# Patient Record
Sex: MALE | NOT HISPANIC OR LATINO | URBAN - METROPOLITAN AREA
[De-identification: names, ages, dates, MRNs, and addresses within clinical notes are randomized per-mention and may not be internally consistent; named-entity substitution may affect disease eponyms.]

---

## 2019-04-01 ENCOUNTER — TELEPHONE (OUTPATIENT)
Dept: DERMATOLOGY | Age: 59
End: 2019-04-01

## 2019-11-06 ENCOUNTER — OFFICE VISIT (OUTPATIENT)
Dept: FAMILY MEDICINE CLINIC | Facility: CLINIC | Age: 59
End: 2019-11-06
Payer: COMMERCIAL

## 2019-11-06 VITALS
WEIGHT: 140 LBS | OXYGEN SATURATION: 99 % | HEIGHT: 63 IN | SYSTOLIC BLOOD PRESSURE: 126 MMHG | HEART RATE: 62 BPM | TEMPERATURE: 97 F | BODY MASS INDEX: 24.8 KG/M2 | DIASTOLIC BLOOD PRESSURE: 84 MMHG

## 2019-11-06 DIAGNOSIS — Z00.00 PREVENTATIVE HEALTH CARE: Primary | ICD-10-CM

## 2019-11-06 DIAGNOSIS — Z00.00 LABORATORY EXAM ORDERED AS PART OF ROUTINE GENERAL MEDICAL EXAMINATION: ICD-10-CM

## 2019-11-06 DIAGNOSIS — K52.9 GASTROENTERITIS: ICD-10-CM

## 2019-11-06 DIAGNOSIS — Z80.0 FAMILY HISTORY OF COLON CANCER: ICD-10-CM

## 2019-11-06 DIAGNOSIS — Z12.5 SCREENING FOR PROSTATE CANCER: ICD-10-CM

## 2019-11-06 DIAGNOSIS — L71.9 ROSACEA: ICD-10-CM

## 2019-11-06 DIAGNOSIS — Z13.220 SCREENING, LIPID: ICD-10-CM

## 2019-11-06 PROCEDURE — 99386 PREV VISIT NEW AGE 40-64: CPT | Performed by: FAMILY MEDICINE

## 2019-11-06 NOTE — H&P
David Almonte is a 63year old male who presents for a complete physical exam.   HPI:   Pt voices concerns of stomach problems x 10 days, lower abd cramping and diarrhea- multiple episodes, no emesis,  Getting better.     Wt Readings from Last 6 Encounte : +. Children: 2 son. Exercise: active w/ job.   Diet: watches minimally     REVIEW OF SYSTEMS:   GENERAL: generally feels well, no fatigue, denies excessive daytime drowsiness, good appetite  SKIN: denies any unusual skin lesions or rashes  EYES:d descended atrophic testes,no masses,no hernia,no penile lesions  RECTAL: good rectal tone, prostate shows no masses, normal size, stool is OB negative, non-thrombosed external hemorrhoids  BACK:  : FROM, No lateral curves, Flexion:  Fingers to floor   EXTR

## 2019-11-06 NOTE — PATIENT INSTRUCTIONS
I reviewed age-appropriate preventive health screening exams as well as immunizations. Flu vaccine declined.   Would recommend shingles vaccine at age 62  Discussed signs and symptoms of skin cancer as well as importance of frequent application of sunscree

## 2019-11-15 ENCOUNTER — TELEPHONE (OUTPATIENT)
Dept: FAMILY MEDICINE CLINIC | Facility: CLINIC | Age: 59
End: 2019-11-15

## 2019-11-15 NOTE — TELEPHONE ENCOUNTER
At this point, taking antiviral medication for a cold sore that is going to last 10 days is not going to change the course of the cold sore.   If this is a recurring problem for him then starting valacyclovir 2 g twice daily for 1 day at the earliest onset

## 2019-11-15 NOTE — TELEPHONE ENCOUNTER
He was here last week for a physical with Dr Bhargavi Mathis and he had a cold sore then and it has not gone away so he is asking for some medication for that.      Huy murphy Laya Root

## 2019-12-14 ENCOUNTER — NURSE ONLY (OUTPATIENT)
Dept: FAMILY MEDICINE CLINIC | Facility: CLINIC | Age: 59
End: 2019-12-14
Payer: COMMERCIAL

## 2019-12-14 DIAGNOSIS — Z13.220 SCREENING, LIPID: ICD-10-CM

## 2019-12-14 DIAGNOSIS — Z12.5 SCREENING FOR PROSTATE CANCER: ICD-10-CM

## 2019-12-14 DIAGNOSIS — Z00.00 LABORATORY EXAM ORDERED AS PART OF ROUTINE GENERAL MEDICAL EXAMINATION: ICD-10-CM

## 2019-12-14 LAB
ALBUMIN SERPL-MCNC: 4 G/DL (ref 3.4–5)
ALBUMIN/GLOB SERPL: 1.1 {RATIO} (ref 1–2)
ALP LIVER SERPL-CCNC: 62 U/L (ref 45–117)
ALT SERPL-CCNC: 30 U/L (ref 16–61)
ANION GAP SERPL CALC-SCNC: 4 MMOL/L (ref 0–18)
AST SERPL-CCNC: 21 U/L (ref 15–37)
BILIRUB SERPL-MCNC: 0.5 MG/DL (ref 0.1–2)
BUN BLD-MCNC: 16 MG/DL (ref 7–18)
BUN/CREAT SERPL: 17.2 (ref 10–20)
CALCIUM BLD-MCNC: 8.5 MG/DL (ref 8.5–10.1)
CHLORIDE SERPL-SCNC: 107 MMOL/L (ref 98–112)
CHOLEST SMN-MCNC: 197 MG/DL (ref ?–200)
CO2 SERPL-SCNC: 28 MMOL/L (ref 21–32)
COMPLEXED PSA SERPL-MCNC: 0.21 NG/ML (ref ?–4)
CREAT BLD-MCNC: 0.93 MG/DL (ref 0.7–1.3)
GLOBULIN PLAS-MCNC: 3.7 G/DL (ref 2.8–4.4)
GLUCOSE BLD-MCNC: 82 MG/DL (ref 70–99)
HDLC SERPL-MCNC: 72 MG/DL (ref 40–59)
LDLC SERPL CALC-MCNC: 112 MG/DL (ref ?–100)
M PROTEIN MFR SERPL ELPH: 7.7 G/DL (ref 6.4–8.2)
NONHDLC SERPL-MCNC: 125 MG/DL (ref ?–130)
OSMOLALITY SERPL CALC.SUM OF ELEC: 288 MOSM/KG (ref 275–295)
PATIENT FASTING Y/N/NP: YES
PATIENT FASTING Y/N/NP: YES
POTASSIUM SERPL-SCNC: 4.6 MMOL/L (ref 3.5–5.1)
SODIUM SERPL-SCNC: 139 MMOL/L (ref 136–145)
TRIGL SERPL-MCNC: 65 MG/DL (ref 30–149)
VLDLC SERPL CALC-MCNC: 13 MG/DL (ref 0–30)

## 2019-12-14 PROCEDURE — 80053 COMPREHEN METABOLIC PANEL: CPT | Performed by: FAMILY MEDICINE

## 2019-12-14 PROCEDURE — 80061 LIPID PANEL: CPT | Performed by: FAMILY MEDICINE

## 2019-12-14 PROCEDURE — 36415 COLL VENOUS BLD VENIPUNCTURE: CPT | Performed by: FAMILY MEDICINE

## 2019-12-14 PROCEDURE — 84153 ASSAY OF PSA TOTAL: CPT | Performed by: FAMILY MEDICINE

## 2019-12-16 DIAGNOSIS — Z12.5 SCREENING FOR PROSTATE CANCER: Primary | ICD-10-CM

## 2020-06-02 ENCOUNTER — TELEPHONE (OUTPATIENT)
Dept: FAMILY MEDICINE CLINIC | Facility: CLINIC | Age: 60
End: 2020-06-02

## 2020-06-02 RX ORDER — ACYCLOVIR 400 MG/1
400 TABLET ORAL 3 TIMES DAILY
Qty: 15 TABLET | Status: SHIPPED | OUTPATIENT
Start: 2020-06-02 | End: 2020-06-02

## 2020-06-02 RX ORDER — ACYCLOVIR 400 MG/1
400 TABLET ORAL 3 TIMES DAILY
Qty: 15 TABLET | Status: SHIPPED | OUTPATIENT
Start: 2020-06-02 | End: 2020-06-07

## 2020-06-02 NOTE — TELEPHONE ENCOUNTER
Pt had physical with Dr Rafat Montanez 11/15/19    Last saw Dr Sophy Frost in 2016  Last Acyclovir 2014    Pt refused appt insisting Dr Sophy Frost will fill meds

## 2020-06-02 NOTE — TELEPHONE ENCOUNTER
Cold sore meds. Offered pt. Appt. Because its been over 6 months and he said Dr. Rodriguez Neither said in his last visit he would call this med in the next time he had a cold sore. I advised our protocal has changed pt. Refused appt.

## 2020-12-08 ENCOUNTER — OFFICE VISIT (OUTPATIENT)
Dept: FAMILY MEDICINE CLINIC | Facility: CLINIC | Age: 60
End: 2020-12-08
Payer: COMMERCIAL

## 2020-12-08 VITALS
BODY MASS INDEX: 25.34 KG/M2 | HEIGHT: 63 IN | HEART RATE: 68 BPM | DIASTOLIC BLOOD PRESSURE: 78 MMHG | WEIGHT: 143 LBS | SYSTOLIC BLOOD PRESSURE: 110 MMHG | TEMPERATURE: 98 F

## 2020-12-08 DIAGNOSIS — Z00.00 PREVENTATIVE HEALTH CARE: Primary | ICD-10-CM

## 2020-12-08 DIAGNOSIS — L71.9 ROSACEA: ICD-10-CM

## 2020-12-08 DIAGNOSIS — M75.81 RIGHT ROTATOR CUFF TENDINITIS: ICD-10-CM

## 2020-12-08 PROCEDURE — 3074F SYST BP LT 130 MM HG: CPT | Performed by: FAMILY MEDICINE

## 2020-12-08 PROCEDURE — 99396 PREV VISIT EST AGE 40-64: CPT | Performed by: FAMILY MEDICINE

## 2020-12-08 PROCEDURE — 3078F DIAST BP <80 MM HG: CPT | Performed by: FAMILY MEDICINE

## 2020-12-08 PROCEDURE — 3008F BODY MASS INDEX DOCD: CPT | Performed by: FAMILY MEDICINE

## 2020-12-08 NOTE — PROGRESS NOTES
Rodolfo Mendoza is a 62year old male. Patient presents with:  Physical  Shoulder Pain: R shoulder      HPI:   Patient presents for routine physical.  He was recently cutting some trees down. He fell off a ladder and rolled onto his right shoulder.   Denise Newby denies headaches    EXAM:   /78 (BP Location: Right arm, Patient Position: Sitting, Cuff Size: adult)   Pulse 68   Temp 97.8 °F (36.6 °C) (Temporal)   Ht 5' 3\" (1.6 m)   Wt 143 lb (64.9 kg)   BMI 25.33 kg/m²   Wt Readings from Last 6 Encounters:  12 2.5 % External Cream 28 g 3     Sig: Apply 1 Application topically 2 (two) times daily as needed.        Imaging & Consults:  None

## 2020-12-22 ENCOUNTER — TELEPHONE (OUTPATIENT)
Dept: FAMILY MEDICINE CLINIC | Facility: CLINIC | Age: 60
End: 2020-12-22

## 2020-12-22 DIAGNOSIS — M75.81 RIGHT ROTATOR CUFF TENDINITIS: Primary | ICD-10-CM

## 2020-12-22 NOTE — TELEPHONE ENCOUNTER
Pt advised. He would like to send referral to [facility] for review.     [facility]  Ph. [phone]  Fax [phone]

## 2020-12-22 NOTE — TELEPHONE ENCOUNTER
Sent mychart msg. Also left detailed voicemail providing PT contact info. Advised to call office with any questions.

## 2020-12-22 NOTE — TELEPHONE ENCOUNTER
Mika Arcos would like to know if you can order the MRI prior to PT. He does not want to waste time and worried that shoulder is worse than he thinks. He states his insurance should cover  the MRI at \"100%\".     He states he is still experiencing pain, abou

## 2020-12-22 NOTE — TELEPHONE ENCOUNTER
SEEN ON 8TH, EXERCISED HE WAS GIVEN TO DO FOR SHOULDER DO NOT SEEM TO BE HELPING. WILL BE AVAILABLE AFTER 4PM TODAY OR TOMORROW.

## 2020-12-23 NOTE — TELEPHONE ENCOUNTER
Left msg for [facility] to call back.  Need to know if they will be taking care of referral approval

## 2020-12-23 NOTE — TELEPHONE ENCOUNTER
Cardiac risk assessment letter completed and sent to [facility] to fax and also contact patient to advise that letter has been faxed to Dentist   MRI order faxed to [facility], [phone]    Referral auth will be obtained by [facility], they will contact the patient and advise him where to go for test.

## 2021-01-04 ENCOUNTER — TELEPHONE (OUTPATIENT)
Dept: FAMILY MEDICINE CLINIC | Facility: CLINIC | Age: 61
End: 2021-01-04

## 2021-01-04 DIAGNOSIS — R93.6 ABNORMAL MRI, SHOULDER: ICD-10-CM

## 2021-01-04 DIAGNOSIS — M75.81 RIGHT ROTATOR CUFF TENDINITIS: Primary | ICD-10-CM

## 2021-01-04 NOTE — TELEPHONE ENCOUNTER
Per Dr. María Hein,     MRI completed through [facility] imaging center shows labral and partial rotator cuff tear. Recommend eval by Dr. Douglas Oconnell - should request copy of disc. Pt advised, verbalized understanding.      Contact info sent via mychart msg per

## 2021-01-08 ENCOUNTER — MED REC SCAN ONLY (OUTPATIENT)
Dept: FAMILY MEDICINE CLINIC | Facility: CLINIC | Age: 61
End: 2021-01-08

## 2021-08-09 ENCOUNTER — TELEPHONE (OUTPATIENT)
Dept: FAMILY MEDICINE CLINIC | Facility: CLINIC | Age: 61
End: 2021-08-09

## 2021-12-14 ENCOUNTER — OFFICE VISIT (OUTPATIENT)
Dept: FAMILY MEDICINE CLINIC | Facility: CLINIC | Age: 61
End: 2021-12-14
Payer: COMMERCIAL

## 2021-12-14 VITALS
HEART RATE: 82 BPM | SYSTOLIC BLOOD PRESSURE: 118 MMHG | HEIGHT: 66 IN | DIASTOLIC BLOOD PRESSURE: 72 MMHG | RESPIRATION RATE: 18 BRPM | WEIGHT: 141 LBS | TEMPERATURE: 99 F | BODY MASS INDEX: 22.66 KG/M2

## 2021-12-14 DIAGNOSIS — Z80.0 FAMILY HISTORY OF COLON CANCER: ICD-10-CM

## 2021-12-14 DIAGNOSIS — L71.9 ROSACEA: ICD-10-CM

## 2021-12-14 DIAGNOSIS — L30.9 ECZEMA, UNSPECIFIED TYPE: ICD-10-CM

## 2021-12-14 DIAGNOSIS — Z00.00 PREVENTATIVE HEALTH CARE: Primary | ICD-10-CM

## 2021-12-14 DIAGNOSIS — Z12.5 PROSTATE CANCER SCREENING: ICD-10-CM

## 2021-12-14 PROCEDURE — 84153 ASSAY OF PSA TOTAL: CPT | Performed by: FAMILY MEDICINE

## 2021-12-14 PROCEDURE — 3078F DIAST BP <80 MM HG: CPT | Performed by: FAMILY MEDICINE

## 2021-12-14 PROCEDURE — 3074F SYST BP LT 130 MM HG: CPT | Performed by: FAMILY MEDICINE

## 2021-12-14 PROCEDURE — 3008F BODY MASS INDEX DOCD: CPT | Performed by: FAMILY MEDICINE

## 2021-12-14 PROCEDURE — 99396 PREV VISIT EST AGE 40-64: CPT | Performed by: FAMILY MEDICINE

## 2021-12-14 NOTE — PROGRESS NOTES
Jeannett Najjar is a 62year old male. Patient presents with:  Physical      HPI:   No complaints  hydrocortisone 2.5 % External Cream, Apply 1 Application topically 2 (two) times daily as needed. , Disp: 28 g, Rfl: 3  metRONIDAZOLE 0.75 % External Cream, kg)  04/22/16 : 135 lb (61.2 kg)  03/01/16 : 152 lb 4 oz (69.1 kg)  07/23/14 : 148 lb 8 oz (67.4 kg)    Patient weight not recorded    GENERAL: well developed, well nourished,in no apparent distress  SKIN: no rashes,no suspicious lesions  HEENT: atraumatic mg/dL       • HDL Cholesterol 12/14/2019 72* 40 - 59 mg/dL Final    Interpretive Information:   An HDL cholesterol <40 mg/dL is low and constitutes a coronary heart disease risk factor.  An HDL cholesterol >60 mg/dL is a negative risk factor for coronary he physicians ask all patients who may be on biotin supplementation to stop biotin consumption at least 72 hours prior to collection of a new sample.          ASSESSMENT AND PLAN:     Preventative health care  (primary encounter diagnosis)  Rosacea  Family h

## 2021-12-15 DIAGNOSIS — Z12.5 PROSTATE CANCER SCREENING: ICD-10-CM

## 2021-12-15 DIAGNOSIS — Z00.00 PREVENTATIVE HEALTH CARE: Primary | ICD-10-CM

## 2021-12-27 NOTE — TELEPHONE ENCOUNTER
Requested Prescriptions     Pending Prescriptions Disp Refills   • hydrocortisone 2.5 % External Cream 28 g 3     Sig: Apply 1 Application topically 2 (two) times daily as needed.      Last refil 28g x 3 refills on 12/8/2020  Last office visit pertaining to

## 2021-12-30 ENCOUNTER — TELEPHONE (OUTPATIENT)
Dept: FAMILY MEDICINE CLINIC | Facility: CLINIC | Age: 61
End: 2021-12-30

## 2021-12-30 NOTE — TELEPHONE ENCOUNTER
hydrocortisone 2.5 % External Cream WAS SENT TO [facility], PT WILL PICK IT UP FROM THERE, BUT ALSO WANTS THIS SENT TO [facility]

## 2022-01-14 ENCOUNTER — OFFICE VISIT (OUTPATIENT)
Dept: SURGERY | Facility: CLINIC | Age: 62
End: 2022-01-14
Payer: COMMERCIAL

## 2022-01-14 VITALS — BODY MASS INDEX: 22.66 KG/M2 | WEIGHT: 141 LBS | HEART RATE: 69 BPM | TEMPERATURE: 97 F | HEIGHT: 66 IN

## 2022-01-14 DIAGNOSIS — Z80.0 FAMILY HISTORY OF COLON CANCER: Primary | ICD-10-CM

## 2022-01-14 PROCEDURE — 3008F BODY MASS INDEX DOCD: CPT | Performed by: SURGERY

## 2022-01-14 PROCEDURE — S0285 CNSLT BEFORE SCREEN COLONOSC: HCPCS | Performed by: SURGERY

## 2022-01-14 RX ORDER — SODIUM, POTASSIUM,MAG SULFATES 17.5-3.13G
SOLUTION, RECONSTITUTED, ORAL ORAL
Qty: 1 EACH | Refills: 0 | Status: SHIPPED | OUTPATIENT
Start: 2022-01-14

## 2022-01-14 NOTE — H&P
Cecy Schwartz is a 62year old male  Patient presents with:  Colonoscopy: Last colonoscopy done 2016 Dr. Guy Pereira. Denies any change in bowel habits.        REFERRED BY    Patient presents for colonoscopy   Was done in 2016 and no polyps found   Pt has no c changes  RESPIRATORY: denies shortness of breath, wheezing or cough   CARDIOVASCULAR: denies chest pain or BOWLES; no palpitations   GI: denies nausea, vomiting, constipation, diarrhea; no rectal bleeding; no heartburn  GENITAL/: no dysuria, urgency or freq monitoring of prostate cancer patients. Elevated PSA concentrations can only suggest the presence of prostate cancer until biopsy is performed, which is required for diagnosis of cancer. PSA concentrations can be elevated in the prostate.   PSA is general

## 2022-01-18 RX ORDER — POLYETHYLENE GLYCOL 3350, SODIUM CHLORIDE, SODIUM BICARBONATE, POTASSIUM CHLORIDE 420; 11.2; 5.72; 1.48 G/4L; G/4L; G/4L; G/4L
POWDER, FOR SOLUTION ORAL
Qty: 1 EACH | Refills: 0 | Status: SHIPPED | OUTPATIENT
Start: 2022-01-18

## 2022-01-21 ENCOUNTER — TELEPHONE (OUTPATIENT)
Dept: FAMILY MEDICINE CLINIC | Facility: CLINIC | Age: 62
End: 2022-01-21

## 2022-01-21 RX ORDER — MOMETASONE FUROATE 1 MG/G
1 CREAM TOPICAL 2 TIMES DAILY PRN
Qty: 60 G | Refills: 0 | Status: SHIPPED | OUTPATIENT
Start: 2022-01-21

## 2022-01-21 NOTE — TELEPHONE ENCOUNTER
MYKEL Lamar [address], [address] [phone], [phone]      PT WOULD LIKE SOMETHING CALLED IN FOR HIS  ECZEMA ON HIS HANDS AND FEET-SPOKE TO KK AT HIS PHYSICAL APPOINTMENT ABOUT THIS    [place]

## 2022-01-21 NOTE — TELEPHONE ENCOUNTER
Dr. Blanco Holland,  Patient requesting Rx for cracked hands; states you spoke with him at physical. Lucrecia Iliananing to wait until back in office.

## 2022-02-02 PROCEDURE — 88305 TISSUE EXAM BY PATHOLOGIST: CPT | Performed by: SURGERY

## 2022-02-07 ENCOUNTER — PATIENT OUTREACH (OUTPATIENT)
Dept: SURGERY | Facility: CLINIC | Age: 62
End: 2022-02-07

## 2022-02-17 ENCOUNTER — TELEPHONE (OUTPATIENT)
Dept: FAMILY MEDICINE CLINIC | Facility: CLINIC | Age: 62
End: 2022-02-17

## 2022-02-17 RX ORDER — MOMETASONE FUROATE 1 MG/G
1 CREAM TOPICAL 2 TIMES DAILY PRN
Qty: 60 G | Refills: 0 | Status: SHIPPED | OUTPATIENT
Start: 2022-02-17

## 2022-02-17 RX ORDER — MOMETASONE FUROATE 1 MG/G
1 CREAM TOPICAL 2 TIMES DAILY PRN
Qty: 60 G | Refills: 0 | Status: SHIPPED | OUTPATIENT
Start: 2022-02-17 | End: 2022-02-17

## 2022-02-17 NOTE — TELEPHONE ENCOUNTER
Per Dr. Trena Molina, pt should not be using cream long-term as it can cause a steroid induced dermatitis. Pt states eczema on chest and back has improved, but is now noticing it on his hands. He has been applying cream BID. Advised pt she should be using cream prn, he should try to limit application and apply thinly. Pt verbalized understanding. Per pt he will be running out of med in a few days. Requests med to be sent to local pharmacy. Advised we will be canceling mail-order prescription. Called [facility] and canceled prescription with [facility] pharmacy tech. Advised pt he needs to be seen if no improvement. Verbalized understanding.

## 2022-02-17 NOTE — TELEPHONE ENCOUNTER
Pt states [facility] has been trying to contact us regarding Mometasone Furoate 0.1 % External Cream but we are not returning their call. He is trying to get the medication filled there. He will have them fax over the questions they have.

## 2022-02-17 NOTE — TELEPHONE ENCOUNTER
Idalia Tobias is calling he would like to know if you could please call in a 90 day supply of Mometasone Furoate 0.1 % External Cream to [facility] on [address] in [place] but please leave the [facility] they have placed that on hold. He is running out of this and needs it sooner then [facility] can get it to him.

## 2022-02-17 NOTE — TELEPHONE ENCOUNTER
Prescription has been canceled per Dr. Gennaro Ruiz orders. New script sent to local pharmacy.  See TE.

## 2022-02-17 NOTE — TELEPHONE ENCOUNTER
LOV: 12/14/21    LAST LAB: n/a    LAST RX: 1/21/22    Next OV: No future appointments.     PROTOCOL: n/a

## 2022-02-23 ENCOUNTER — TELEPHONE (OUTPATIENT)
Dept: FAMILY MEDICINE CLINIC | Facility: CLINIC | Age: 62
End: 2022-02-23

## 2022-02-23 RX ORDER — MOMETASONE FUROATE 1 MG/G
1 CREAM TOPICAL 2 TIMES DAILY PRN
Qty: 60 G | Refills: 0 | Status: CANCELLED | OUTPATIENT
Start: 2022-02-23

## 2022-02-23 NOTE — TELEPHONE ENCOUNTER
Calling pt-  Mometasone cream was called into [facility] in [facility] 02/17. Vicente is okay with this, was unaware it was sent there, but [facility] is okay. He will go there and get this.

## 2022-02-23 NOTE — TELEPHONE ENCOUNTER
MOMETASONE CREAM      NEEDS 1 TUBE CALLED TO  [facility] IN [place] BECAUSE HE NEEDS THIS BEFORE  COMES IN THE MAIL FROM [facility]

## 2022-11-17 RX ORDER — MOMETASONE FUROATE 1 MG/G
1 CREAM TOPICAL 2 TIMES DAILY PRN
Qty: 60 G | Refills: 0 | Status: SHIPPED | OUTPATIENT
Start: 2022-11-17

## 2022-11-17 NOTE — TELEPHONE ENCOUNTER
Mometasone   Last refilled 2/17/22 #60g/0 refills    Hydrocortisone   Last refilled 12/30/21 #28g/3 refills    Last OV 12/14/21 with Dr. Dunia Carty  No pending visit  [facility] sent advising pt he is due for an OV

## 2023-03-25 ENCOUNTER — TELEPHONE (OUTPATIENT)
Dept: FAMILY MEDICINE CLINIC | Facility: CLINIC | Age: 63
End: 2023-03-25

## 2023-03-27 ENCOUNTER — MED REC SCAN ONLY (OUTPATIENT)
Dept: FAMILY MEDICINE CLINIC | Facility: CLINIC | Age: 63
End: 2023-03-27

## 2023-03-27 RX ORDER — MOMETASONE FUROATE 1 MG/G
CREAM TOPICAL
Qty: 45 G | Refills: 0 | Status: SHIPPED | OUTPATIENT
Start: 2023-03-27

## 2023-03-27 NOTE — TELEPHONE ENCOUNTER
See pt's REPLY to Breann's message. Pt has not been seen in our office since 12/14/21 and has been advised to schedule an appt with you to get established.

## 2023-04-01 ENCOUNTER — TELEPHONE (OUTPATIENT)
Dept: FAMILY MEDICINE CLINIC | Facility: CLINIC | Age: 63
End: 2023-04-01

## 2023-04-01 NOTE — TELEPHONE ENCOUNTER
MOMETASONE FUROATE 0.1 % External Cream was sent to pharmacy. Pharmacy advised pt that the script was enough for 23 days. He said he always get a 90 day refill. He wanted to know why it was written like that.

## 2023-04-02 NOTE — TELEPHONE ENCOUNTER
I have not seen him in over 3 yrs and chronic use use of topical steroids can cause skin damage if used inappropriately.   He needs to make a f/u visit to establish care since Dr Oscar Teixeira left

## 2023-04-03 RX ORDER — MOMETASONE FUROATE 1 MG/G
CREAM TOPICAL
Qty: 60 G | Refills: 1 | OUTPATIENT
Start: 2023-04-03

## 2023-05-09 ENCOUNTER — TELEPHONE (OUTPATIENT)
Dept: FAMILY MEDICINE CLINIC | Facility: CLINIC | Age: 63
End: 2023-05-09

## 2023-08-29 ENCOUNTER — OFFICE VISIT (OUTPATIENT)
Dept: FAMILY MEDICINE CLINIC | Facility: CLINIC | Age: 63
End: 2023-08-29
Payer: COMMERCIAL

## 2023-08-29 ENCOUNTER — LABORATORY ENCOUNTER (OUTPATIENT)
Dept: LAB | Age: 63
End: 2023-08-29
Attending: FAMILY MEDICINE
Payer: COMMERCIAL

## 2023-08-29 VITALS
TEMPERATURE: 97 F | HEART RATE: 73 BPM | DIASTOLIC BLOOD PRESSURE: 70 MMHG | SYSTOLIC BLOOD PRESSURE: 120 MMHG | RESPIRATION RATE: 16 BRPM | OXYGEN SATURATION: 98 % | BODY MASS INDEX: 23.49 KG/M2 | WEIGHT: 141 LBS | HEIGHT: 65 IN

## 2023-08-29 DIAGNOSIS — H93.13 TINNITUS OF BOTH EARS: ICD-10-CM

## 2023-08-29 DIAGNOSIS — Z00.00 PREVENTATIVE HEALTH CARE: ICD-10-CM

## 2023-08-29 DIAGNOSIS — R74.8 ELEVATED LIVER ENZYMES: Primary | ICD-10-CM

## 2023-08-29 DIAGNOSIS — Z13.220 SCREENING, LIPID: ICD-10-CM

## 2023-08-29 DIAGNOSIS — Z00.00 PREVENTATIVE HEALTH CARE: Primary | ICD-10-CM

## 2023-08-29 DIAGNOSIS — Z12.5 PROSTATE CANCER SCREENING: ICD-10-CM

## 2023-08-29 DIAGNOSIS — D12.6 TUBULAR ADENOMA OF COLON: ICD-10-CM

## 2023-08-29 DIAGNOSIS — Z80.0 FAMILY HISTORY OF COLON CANCER: ICD-10-CM

## 2023-08-29 DIAGNOSIS — H90.3 SENSORINEURAL HEARING LOSS (SNHL) OF BOTH EARS: ICD-10-CM

## 2023-08-29 LAB
ALBUMIN SERPL-MCNC: 3.9 G/DL (ref 3.4–5)
ALBUMIN/GLOB SERPL: 1.2 {RATIO} (ref 1–2)
ALP LIVER SERPL-CCNC: 66 U/L
ALT SERPL-CCNC: 97 U/L
ANION GAP SERPL CALC-SCNC: 6 MMOL/L (ref 0–18)
AST SERPL-CCNC: 263 U/L (ref 15–37)
BILIRUB SERPL-MCNC: 0.6 MG/DL (ref 0.1–2)
BUN BLD-MCNC: 12 MG/DL (ref 7–18)
CALCIUM BLD-MCNC: 8.8 MG/DL (ref 8.5–10.1)
CHLORIDE SERPL-SCNC: 97 MMOL/L (ref 98–112)
CO2 SERPL-SCNC: 28 MMOL/L (ref 21–32)
CREAT BLD-MCNC: 0.74 MG/DL
EGFRCR SERPLBLD CKD-EPI 2021: 102 ML/MIN/1.73M2 (ref 60–?)
FASTING STATUS PATIENT QL REPORTED: NO
GLOBULIN PLAS-MCNC: 3.3 G/DL (ref 2.8–4.4)
GLUCOSE BLD-MCNC: 91 MG/DL (ref 70–99)
OSMOLALITY SERPL CALC.SUM OF ELEC: 271 MOSM/KG (ref 275–295)
POTASSIUM SERPL-SCNC: 4.5 MMOL/L (ref 3.5–5.1)
PROT SERPL-MCNC: 7.2 G/DL (ref 6.4–8.2)
SODIUM SERPL-SCNC: 131 MMOL/L (ref 136–145)

## 2023-08-29 PROCEDURE — 3074F SYST BP LT 130 MM HG: CPT | Performed by: FAMILY MEDICINE

## 2023-08-29 PROCEDURE — 3008F BODY MASS INDEX DOCD: CPT | Performed by: FAMILY MEDICINE

## 2023-08-29 PROCEDURE — 3078F DIAST BP <80 MM HG: CPT | Performed by: FAMILY MEDICINE

## 2023-08-29 PROCEDURE — 99396 PREV VISIT EST AGE 40-64: CPT | Performed by: FAMILY MEDICINE

## 2023-08-29 PROCEDURE — 80053 COMPREHEN METABOLIC PANEL: CPT | Performed by: FAMILY MEDICINE

## 2023-08-31 ENCOUNTER — LABORATORY ENCOUNTER (OUTPATIENT)
Dept: LAB | Age: 63
End: 2023-08-31
Attending: FAMILY MEDICINE
Payer: COMMERCIAL

## 2023-08-31 DIAGNOSIS — Z12.5 PROSTATE CANCER SCREENING: ICD-10-CM

## 2023-08-31 DIAGNOSIS — R74.8 ELEVATED LIVER ENZYMES: ICD-10-CM

## 2023-08-31 LAB
COMPLEXED PSA SERPL-MCNC: 0.25 NG/ML (ref ?–4)
HBV SURFACE AB SER QL: NONREACTIVE
HBV SURFACE AB SERPL IA-ACNC: <3.1 MIU/ML
HBV SURFACE AG SER-ACNC: <0.1 [IU]/L
HBV SURFACE AG SERPL QL IA: NONREACTIVE
HCV AB SERPL QL IA: NONREACTIVE

## 2023-08-31 PROCEDURE — 86803 HEPATITIS C AB TEST: CPT

## 2023-08-31 PROCEDURE — 87340 HEPATITIS B SURFACE AG IA: CPT

## 2023-08-31 PROCEDURE — 86706 HEP B SURFACE ANTIBODY: CPT

## 2023-09-01 ENCOUNTER — TELEPHONE (OUTPATIENT)
Dept: FAMILY MEDICINE CLINIC | Facility: CLINIC | Age: 63
End: 2023-09-01

## 2023-09-01 DIAGNOSIS — R74.8 ELEVATED LIVER ENZYMES: Primary | ICD-10-CM

## 2023-09-19 ENCOUNTER — TELEPHONE (OUTPATIENT)
Dept: FAMILY MEDICINE CLINIC | Facility: CLINIC | Age: 63
End: 2023-09-19

## 2023-09-19 NOTE — TELEPHONE ENCOUNTER
PT SCHEDULED TO HAVE LABS DONE NEXT WEEK. PT IS ASKING IF ABD US IS ASSOCIATED WITH BLOOD WORK. DOES HE NEED TO HAVE ONE OR THE OTHER DONE FIRST?

## 2023-09-19 NOTE — TELEPHONE ENCOUNTER
CHONG---Spoke with pt. Dr. Vielka Odell ordered repeat LFT's and a liver ultrasound after his 8/31 labwork. Dr. Vielka Odell also instructed pt to avoid alcohol and Tylenol x2 weeks prior to repeat labwork. Pt is scheduled or labwork on 9/28 and liver ultrasound on 10/13. Pt asks if he will need the liver ultrasound if his liver enzymes come down? ?  (Reports he has stopped alcohol, so he's hoping this will help).     *When you review pt's 9/28 lab results, please advise if pt still needs ultrasound done (pending lab results)

## 2023-09-20 ENCOUNTER — TELEPHONE (OUTPATIENT)
Dept: FAMILY MEDICINE CLINIC | Facility: CLINIC | Age: 63
End: 2023-09-20

## 2023-09-20 NOTE — TELEPHONE ENCOUNTER
L/m on pt's v/m re: below. Advised that on 9/1/23, Dr. Cata Whiting recommended rechecking LFT's 2 weeks later. Result note said pt was going to be on vacation mid-month. Advised as long as he has avoided all alcohol and Tylenol x 2weeks, he can recheck labs. Advised if he needs to extend it out (to be off both x2wks), he can call to reschedule.

## 2023-09-20 NOTE — TELEPHONE ENCOUNTER
Pt has lab work scheduled on 9/28. He wanted to know if that was too soon to do. He thought he needed to wait until beginning of October to do labs.

## 2023-09-28 ENCOUNTER — LABORATORY ENCOUNTER (OUTPATIENT)
Dept: LAB | Age: 63
End: 2023-09-28
Attending: FAMILY MEDICINE
Payer: COMMERCIAL

## 2023-09-28 DIAGNOSIS — R74.8 ELEVATED LIVER ENZYMES: ICD-10-CM

## 2023-09-28 LAB
ALBUMIN SERPL-MCNC: 3.9 G/DL (ref 3.4–5)
ALP LIVER SERPL-CCNC: 63 U/L
ALT SERPL-CCNC: 24 U/L
ANION GAP SERPL CALC-SCNC: 4 MMOL/L (ref 0–18)
AST SERPL-CCNC: 20 U/L (ref 15–37)
BILIRUB DIRECT SERPL-MCNC: 0.2 MG/DL (ref 0–0.2)
BILIRUB SERPL-MCNC: 0.6 MG/DL (ref 0.1–2)
BUN BLD-MCNC: 10 MG/DL (ref 7–18)
CALCIUM BLD-MCNC: 8.9 MG/DL (ref 8.5–10.1)
CHLORIDE SERPL-SCNC: 102 MMOL/L (ref 98–112)
CO2 SERPL-SCNC: 27 MMOL/L (ref 21–32)
CREAT BLD-MCNC: 0.83 MG/DL
EGFRCR SERPLBLD CKD-EPI 2021: 99 ML/MIN/1.73M2 (ref 60–?)
FASTING STATUS PATIENT QL REPORTED: YES
GLUCOSE BLD-MCNC: 96 MG/DL (ref 70–99)
OSMOLALITY SERPL CALC.SUM OF ELEC: 275 MOSM/KG (ref 275–295)
POTASSIUM SERPL-SCNC: 4.9 MMOL/L (ref 3.5–5.1)
PROT SERPL-MCNC: 7.3 G/DL (ref 6.4–8.2)
SODIUM SERPL-SCNC: 133 MMOL/L (ref 136–145)

## 2023-09-28 PROCEDURE — 80048 BASIC METABOLIC PNL TOTAL CA: CPT | Performed by: FAMILY MEDICINE

## 2023-09-28 PROCEDURE — 80076 HEPATIC FUNCTION PANEL: CPT | Performed by: FAMILY MEDICINE

## 2023-10-02 ENCOUNTER — OFFICE VISIT (OUTPATIENT)
Facility: LOCATION | Age: 63
End: 2023-10-02
Payer: COMMERCIAL

## 2023-10-02 DIAGNOSIS — H93.19 TINNITUS, UNSPECIFIED LATERALITY: Primary | ICD-10-CM

## 2023-10-02 DIAGNOSIS — H93.13 TINNITUS OF BOTH EARS: Primary | ICD-10-CM

## 2023-10-02 DIAGNOSIS — H90.3 SENSORY HEARING LOSS, BILATERAL: ICD-10-CM

## 2023-10-02 PROCEDURE — 99204 OFFICE O/P NEW MOD 45 MIN: CPT | Performed by: OTOLARYNGOLOGY

## 2023-10-02 PROCEDURE — 92567 TYMPANOMETRY: CPT | Performed by: AUDIOLOGIST

## 2023-10-02 PROCEDURE — 92557 COMPREHENSIVE HEARING TEST: CPT | Performed by: AUDIOLOGIST

## 2023-10-02 NOTE — PROGRESS NOTES
Nereida Pittman was seen for an audiometric evaluation and tympanogram today. Referred back to physician. Hearing evaluation recommended.     Ben Jenkins MS, CCC-A

## 2023-10-02 NOTE — PROGRESS NOTES
[facility], [facility] [place] Narda Mar    Report of Consultation    Date of Consult: 10/2/2023     Reason for Consultation:   Recent hearing. History of Present Illness:   Patient is a 59year old male who is being seen for decreased hearing with bilateral tinnitus. Patient feels that it has been present for years. He worked as a [employer] for over 30 years. At times he did not wear ear protection. No prior history ear surgery head trauma. No significant history ear infections.     Past Medical History  Past Medical History:   Diagnosis Date    Family history of colon cancer 3/1/2016    Rosacea 2/9/2000       Past Surgical History  Past Surgical History:   Procedure Laterality Date    COLONOSCOPY N/A 2/2/2022    Procedure: COLONOSCOPY, POSSIBLE BIOPSY, POSSIBLE POLYPECTOMY;  Surgeon: Caterina Ford DO;  Location: [facility]    COLONOSCOPY,DIAGNOSTIC N/A 4/22/2016    Procedure: COLONOSCOPY, POSSIBLE BIOPSY, POSSIBLE POLYPECTOMY 08046;  Surgeon: Caterina Ford DO;  Location: [facility]    HERNIA SURGERY Bilateral     INGUINAL HERNIA REPAIR  1980'S, EARLY 80'S       Family History  Family History   Problem Relation Age of Onset    Cancer Father 77        colon    No Known Problems Son     No Known Problems Son     Cancer Brother         [place] Lymphoma    Other (non hodgkins) Brother     Cancer Brother         [place] Lymphoma    Alcohol and Other Disorders Associated Brother     Cancer Other     Stroke Neg     Heart Disease Neg        Social History  Patient Guardians:  Not on file    Other Topics            Concern  Caffeine Concern        Yes    Comment:4 cups of decaf. coffee daily  Exercise                Yes    Comment:active  Seat Belt               Yes  Special Diet            No  Stress Concern          No  Weight Concern          Yes    Social History Narrative    None on file            Current Medications:  Current Outpatient Medications   Medication Sig Dispense Refill MOMETASONE FUROATE 0.1 % External Cream APPLY 1 APPLICATION TOPICALLY TWICE A DAY AS NEEDED. APPLY THINLY TWICE A DAY 45 g 0    hydrocortisone 2.5 % External Cream Apply 1 Application topically 2 (two) times daily as needed. 28 g 3    metRONIDAZOLE 0.75 % External Cream Apply 1 Application topically 2 (two) times daily as needed. 45 g 1       Allergies  No Known Allergies    Review of Systems:   A comprehensive review of systems was negative. Physical Exam:   There were no vitals taken for this visit. Constitutional Normal Overall appearance - Normal.   Psychiatric Normal Orientation - Oriented to time, place, person & situation. Appropriate mood and affect. Head/Face Normal Facial features -- Normal. Skull - Normal.   Eyes Normal Pupils equal ,round ,react to light and accomidate   Ears Normal External Ear Right: Normal, Left: Normal. Canal - Right: Normal, Left: Normal. TM - Right: Normal, Left: Normal.   Nose Normal External Nose, Normal, Septum -Midline, Right, Left Turbinates - Right: Normal, Hypertrophic Left: Normal, Hypertrophic   Mouth/Throat Normal Lips/teeth/gums - Normal. Tonsils - Normal. Oropharynx - Normal.   Neck Exam Normal Inspection - Normal. Palpation - Normal. Parotid gland - Normal. Thyroid gland - Normal.   Neurological Normal Memory - Normal. Cranial nerves - Cranial nerves II through XII grossly intact. Nasopharynx Normal  Normal        Skin Normal Inspection - Normal.        Lymph Detail Normal Submental. Submandibular. Anterior cervical. Posterior cervical. Supraclavicular. Patients exam showed wax impaction right ear, wax impaction left ear. Ear wax was removed under the operative microscope. No complications. Patient tolerated the procedure well. Ear exam findings listed in note after removal.    Audiogram shows symmetric high-frequency sensorineural hearing loss above 2000 Hz which appears severe.   Discrimination and tympanogram are normal.        Results: Laboratory Data:  Lab Results   Component Value Date    WBC 8.9 07/22/2014    HGB 13.9 07/22/2014    HCT 40.3 07/22/2014    .0 07/22/2014    CREATSERUM 0.83 09/28/2023    BUN 10 09/28/2023     (L) 09/28/2023    K 4.9 09/28/2023     09/28/2023    CO2 27.0 09/28/2023    GLU 96 09/28/2023    CA 8.9 09/28/2023    ALB 3.9 09/28/2023    ALKPHO 63 09/28/2023    TP 7.3 09/28/2023    AST 20 09/28/2023    ALT 24 09/28/2023    ESRML 60 (H) 07/22/2014         Imaging:  No results found. Impression:   Severe sensorineural hearing loss history of noise exposure with tinnitus. Recommendations:  He will see audiology regarding amplification. Also urged him to try Lipo flavonoid to see if that would improve the ringing. Otherwise he should have repeat audiogram in 6 to 12 months seeing if there is any progression. Patient understands her treatment plan. Thank you for allowing me to participate in the care of your patient.       Reji Latham MD  10/2/2023

## 2023-10-28 RX ORDER — MOMETASONE FUROATE 1 MG/G
1 CREAM TOPICAL 2 TIMES DAILY PRN
Qty: 45 G | Refills: 0 | Status: SHIPPED | OUTPATIENT
Start: 2023-10-28

## 2023-11-09 ENCOUNTER — TELEPHONE (OUTPATIENT)
Dept: FAMILY MEDICINE CLINIC | Facility: CLINIC | Age: 63
End: 2023-11-09

## 2023-11-09 RX ORDER — MOMETASONE FUROATE 1 MG/G
1 CREAM TOPICAL 2 TIMES DAILY PRN
Qty: 135 G | Refills: 0 | Status: SHIPPED | OUTPATIENT
Start: 2023-11-09

## 2023-11-09 NOTE — TELEPHONE ENCOUNTER
Patient is asking for a script for mometasone script for 90 days. Last script was dispensed for 30 days (45g). Script pended for approval for 135g. Patient states that he does use most of the 45g tube once per month especially in the winter. LOV 8/29/23  No future appointments.

## 2024-04-03 RX ORDER — MOMETASONE FUROATE 1 MG/G
1 CREAM TOPICAL 2 TIMES DAILY PRN
Qty: 135 G | Refills: 3 | Status: SHIPPED | OUTPATIENT
Start: 2024-04-03

## 2024-04-03 NOTE — TELEPHONE ENCOUNTER
Mometasone furoate   Last refilled 11/9/23 #135g/0 refills    Hydrocortisone   Last refilled 11/17/22 #28g/3 refills     Last OV 8/29/23

## 2024-10-11 RX ORDER — ACYCLOVIR 400 MG/1
400 TABLET ORAL 3 TIMES DAILY
Qty: 15 TABLET | OUTPATIENT
Start: 2024-10-11 | End: 2024-10-16

## 2024-10-11 NOTE — TELEPHONE ENCOUNTER
Spoke with patient who states he just bought Abreva and he thinks it might  be working. He will try that and if it doesn't help resolve the coldsore then he will call us back.

## 2024-10-11 NOTE — TELEPHONE ENCOUNTER
Patient is requesting refill for coldsore cream (patient does not know the name of medication) TRISTIN Barrett

## 2025-02-06 ENCOUNTER — TELEPHONE (OUTPATIENT)
Dept: FAMILY MEDICINE CLINIC | Facility: CLINIC | Age: 65
End: 2025-02-06

## 2025-02-06 NOTE — TELEPHONE ENCOUNTER
Spoke with patient who states he is due for a colonoscopy. His last one was done by Dr. Chauhan, who has since retired. Advised that Dr. Ramírez recommends Dr. Kraus or Dr. See. Contact information given for both. Patient is going to look into both and also contact his insurance. He will let us know who he decides.

## (undated) NOTE — Clinical Note
Riki Phipps saw Elizabeth Villalobos in the office today. He presents for screening colonoscopy. His last one was 5 years ago. He has high risk based on his family history.   Thank you Ana Meyer

## (undated) NOTE — LETTER
12/06/19        Vic Maldonado  [address] [facility]      Dear Yudy Steen,    Our records indicate that you have outstanding lab work and or testing that was ordered for you and has not yet been completed:  Orders Placed This Encounter